# Patient Record
Sex: FEMALE | Race: WHITE | Employment: UNEMPLOYED | URBAN - METROPOLITAN AREA
[De-identification: names, ages, dates, MRNs, and addresses within clinical notes are randomized per-mention and may not be internally consistent; named-entity substitution may affect disease eponyms.]

---

## 2021-05-22 ENCOUNTER — HOSPITAL ENCOUNTER (EMERGENCY)
Age: 41
Discharge: HOME | End: 2021-05-22
Attending: EMERGENCY MEDICINE

## 2021-05-22 VITALS
SYSTOLIC BLOOD PRESSURE: 155 MMHG | BODY MASS INDEX: 32.58 KG/M2 | TEMPERATURE: 97.7 F | OXYGEN SATURATION: 99 % | HEART RATE: 70 BPM | WEIGHT: 215 LBS | DIASTOLIC BLOOD PRESSURE: 89 MMHG | RESPIRATION RATE: 18 BRPM | HEIGHT: 68 IN

## 2021-05-22 DIAGNOSIS — M54.50 ACUTE EXACERBATION OF CHRONIC LOW BACK PAIN: Primary | ICD-10-CM

## 2021-05-22 DIAGNOSIS — G89.29 ACUTE EXACERBATION OF CHRONIC LOW BACK PAIN: Primary | ICD-10-CM

## 2021-05-22 PROCEDURE — 6370000000 HC RX 637 (ALT 250 FOR IP): Performed by: STUDENT IN AN ORGANIZED HEALTH CARE EDUCATION/TRAINING PROGRAM

## 2021-05-22 PROCEDURE — 99284 EMERGENCY DEPT VISIT MOD MDM: CPT

## 2021-05-22 RX ORDER — DIAZEPAM 5 MG/1
5 TABLET ORAL EVERY 8 HOURS PRN
Qty: 10 TABLET | Refills: 0 | Status: SHIPPED | OUTPATIENT
Start: 2021-05-22 | End: 2021-05-22 | Stop reason: SDUPTHER

## 2021-05-22 RX ORDER — CYCLOBENZAPRINE HCL 10 MG
10 TABLET ORAL NIGHTLY PRN
Qty: 10 TABLET | Refills: 0 | Status: SHIPPED | OUTPATIENT
Start: 2021-05-22 | End: 2021-06-21

## 2021-05-22 RX ORDER — LIDOCAINE 4 G/G
2 PATCH TOPICAL DAILY
Status: DISCONTINUED | OUTPATIENT
Start: 2021-05-22 | End: 2021-05-22 | Stop reason: HOSPADM

## 2021-05-22 RX ORDER — DIAZEPAM 5 MG/1
5 TABLET ORAL ONCE
Status: COMPLETED | OUTPATIENT
Start: 2021-05-22 | End: 2021-05-22

## 2021-05-22 RX ORDER — DIAZEPAM 5 MG/1
5 TABLET ORAL EVERY 8 HOURS PRN
Qty: 10 TABLET | Refills: 0 | Status: SHIPPED | OUTPATIENT
Start: 2021-05-22 | End: 2021-05-26

## 2021-05-22 RX ORDER — CYCLOBENZAPRINE HCL 10 MG
10 TABLET ORAL NIGHTLY PRN
Qty: 10 TABLET | Refills: 0 | Status: SHIPPED | OUTPATIENT
Start: 2021-05-22 | End: 2021-05-22 | Stop reason: SDUPTHER

## 2021-05-22 RX ORDER — IBUPROFEN 400 MG/1
400 TABLET ORAL ONCE
Status: DISCONTINUED | OUTPATIENT
Start: 2021-05-22 | End: 2021-05-22 | Stop reason: HOSPADM

## 2021-05-22 RX ORDER — CYCLOBENZAPRINE HCL 10 MG
10 TABLET ORAL ONCE
Status: COMPLETED | OUTPATIENT
Start: 2021-05-22 | End: 2021-05-22

## 2021-05-22 RX ADMIN — CYCLOBENZAPRINE 10 MG: 10 TABLET, FILM COATED ORAL at 14:38

## 2021-05-22 RX ADMIN — DIAZEPAM 5 MG: 5 TABLET ORAL at 14:38

## 2021-05-22 ASSESSMENT — ENCOUNTER SYMPTOMS
BLOOD IN STOOL: 0
ABDOMINAL PAIN: 0
RHINORRHEA: 0
VOMITING: 0
BACK PAIN: 1
NAUSEA: 0
SORE THROAT: 0
COUGH: 0
DIARRHEA: 0
CONSTIPATION: 0

## 2021-05-22 ASSESSMENT — PAIN DESCRIPTION - PAIN TYPE: TYPE: ACUTE PAIN

## 2021-05-22 ASSESSMENT — PAIN DESCRIPTION - FREQUENCY: FREQUENCY: CONTINUOUS

## 2021-05-22 ASSESSMENT — PAIN DESCRIPTION - DIRECTION: RADIATING_TOWARDS: DOWN LEGS

## 2021-05-22 ASSESSMENT — PAIN DESCRIPTION - LOCATION: LOCATION: BACK

## 2021-05-22 ASSESSMENT — PAIN DESCRIPTION - DESCRIPTORS: DESCRIPTORS: BURNING

## 2021-05-22 NOTE — ED PROVIDER NOTES
ED Attending Attestation Note     Date of evaluation: 5/22/2021    This patient was seen by the resident. I have seen and examined the patient, agree with the workup, evaluation, management and diagnosis. The care plan has been discussed. My assessment reveals patient here with acute exacerbation of chronic low back pain who is new to the [facility] and has not yet established primary care. The patient was treated symptomatically here and there were no concerning signs on physical exam.  She will be referred to the [facility] clinic for follow-up and started on appropriate medications. Skye Dean MD  05/22/21 0506

## 2021-05-22 NOTE — ED PROVIDER NOTES
[id number] [facility] RESIDENT NOTE       Date of evaluation: 5/22/2021    Chief Complaint     Back Pain (burning pain to lower back x3 days, now with pain down both legs)      History of Present Illness     Rosario Simmonds is a 36 y.o. female with degenerative disc disease who presents with back pain. The patient reports that she deals with chronic back pain from time to time but has an episode now that started 3 days ago while she was leaning down to  a towel. This is described as a sharp pain in the right lower flank, now radiating to both legs to the level of the mid thigh. Is not associate with any weakness, numbness or tingling. She has had no issues with urinary or bowel incontinence, nor saddle anesthesia. She is ambulatory. She has had no falls or other trauma. She is not anticoagulated. She is had no fevers and does not use intravenous drugs. Previously she had been on cyclobenzaprine which seemed to help significantly with her symptoms. She just moved to the area from [facility]. Review of Systems     Review of Systems   Constitutional: Negative for chills, fever and unexpected weight change. HENT: Negative for rhinorrhea and sore throat. Eyes: Negative for visual disturbance. Respiratory: Negative for cough. Cardiovascular: Negative for chest pain and leg swelling. Gastrointestinal: Negative for abdominal pain, blood in stool, constipation, diarrhea, nausea and vomiting. Genitourinary: Negative for dysuria. Musculoskeletal: Positive for back pain. Skin: Negative for rash. Neurological: Negative for dizziness, weakness, numbness and headaches. Physical Exam     INITIAL VITALS: BP: (!) 217/154, Temp: 98.9 °F (37.2 °C), Pulse: 82, Resp: 19, SpO2: 99 %   General: Uncomfortable but nontoxic appearing woman  HEENT:  Normocephalic,  atraumatic. PERRL. Conjunctivae pink, moist. No scleral icterus. No nasal discharge.  Moist mucus membranes. Neck:  Supple. Trachea midline. Pulmonary:   Normal respiratory effort. Cardiac: Regular rate and rhythm. No murmurs, gallops or rubs. Radial pulses 2+ bilaterally. Capillary refill < 2 seconds in fingers. Abdomen:  Soft, not tender, not distended. Bowel sounds present. Musculoskeletal:  Normal bulk and tone for age. Skin:  Warm, dry. No rashes, ecchymosis or cyanosis. Neuro: Alert and oriented x 4. Cranial nerves grossly intact. Moving all extremities equally -hip flexors are 5 out of 5 bilaterally as are knee extensors, flexors, ankle plantar and dorsiflexion. Toes are mute bilaterally. Patellar reflexes 1+ bilaterally. No clonus in the bilateral ankles. Sensations intact to light touch throughout the dermatomes of the lower extremities bilaterally. Gait normal.  Extremities:  No peripheral edema. Psych: Euthymic affect. Normal rate and rhythm of speech with full prosody. Linear thought process. ED Course     Nursing Notes, Past Medical Hx, Past Surgical Hx, Social Hx, Allergies, and Family Hx were reviewed. The patient was given the following medications:  Orders Placed This Encounter   Medications    DISCONTD: lidocaine 4 % external patch 2 patch    DISCONTD: ibuprofen (ADVIL;MOTRIN) tablet 400 mg    cyclobenzaprine (FLEXERIL) tablet 10 mg    diazePAM (VALIUM) tablet 5 mg    DISCONTD: cyclobenzaprine (FLEXERIL) 10 MG tablet     Sig: Take 1 tablet by mouth nightly as needed for Muscle spasms     Dispense:  10 tablet     Refill:  0    DISCONTD: diazePAM (VALIUM) 5 MG tablet     Sig: Take 1 tablet by mouth every 8 hours as needed (back pain) for up to 10 doses. Dispense:  10 tablet     Refill:  0    cyclobenzaprine (FLEXERIL) 10 MG tablet     Sig: Take 1 tablet by mouth nightly as needed for Muscle spasms     Dispense:  10 tablet     Refill:  0    diazePAM (VALIUM) 5 MG tablet     Sig: Take 1 tablet by mouth every 8 hours as needed (back pain) for up to 10 doses. diazePAM (VALIUM) 5 MG tablet Take 1 tablet by mouth every 8 hours as needed (back pain) for up to 10 doses. , Disp-10 tablet, R-0Print             DISPOSITION Decision To Discharge 05/22/2021 02:05:17 PM      Diagnostic Results     EKG   Interpreted in conjunction with emergency department physician No att. providers found  None    RADIOLOGY:  No orders to display       LABS:   No results found for this visit on 05/22/21. ED BEDSIDE ULTRASOUND:  None     RECENT VITALS:  BP: (!) 155/89, Temp: 97.7 °F (36.5 °C), Pulse: 70, Resp: 18, SpO2: 99 %     Procedures     None     Past Medical, Surgical, Family, and Social History     She has a past medical history of Kidney stone. She has a past surgical history that includes Kidney removal.  Her family history is not on file. She reports that she has been smoking. She has been smoking about 1.00 pack per day. She has never used smokeless tobacco. She reports current alcohol use. She reports previous drug use. Medications     Discharge Medication List as of 5/22/2021  3:04 PM          Allergies     She has No Known Allergies.        Meet Stevenson MD  Resident  05/22/21 4429

## 2021-05-22 NOTE — ED NOTES
Patient discharged with AVS and personal belongings. Reviewed and educated on medications. Verbalizes understanding of instructions and need for follow up with. Denies any questions at this time. No signs of acute distress.       Jovany Cruz RN  05/22/21 1446

## 2021-05-24 NOTE — ED NOTES
Patient called the ED this AM.  States that one Rx for cyclobenzaprine was e-prescribed to her pharmacy, [facility]. However, the Rx for diazepam was not at [facility]. Called [facility] [phone], confirmed receipt of cyclobenzaprine on 5/22. Verbally provided Rx for diazepam as generated by Dr. Leila Carlos on 5/22: Diazepam 5mg PO q8h prn back pain Disp #10 with no refills. Patient aware that diazepam now called in. States that she did contact [phone] for an appointment already for f/u.       Dawn CamarilloD., BCPS   5/24/2021 9:44 AM  Wireless: [phone]

## 2021-06-01 ENCOUNTER — OFFICE VISIT (OUTPATIENT)
Dept: INTERNAL MEDICINE CLINIC | Age: 41
End: 2021-06-01

## 2021-06-01 VITALS
BODY MASS INDEX: 38.91 KG/M2 | RESPIRATION RATE: 16 BRPM | SYSTOLIC BLOOD PRESSURE: 121 MMHG | DIASTOLIC BLOOD PRESSURE: 76 MMHG | HEART RATE: 102 BPM | OXYGEN SATURATION: 97 % | TEMPERATURE: 96.8 F | HEIGHT: 66 IN | WEIGHT: 242.1 LBS

## 2021-06-01 DIAGNOSIS — M54.50 CHRONIC MIDLINE LOW BACK PAIN, UNSPECIFIED WHETHER SCIATICA PRESENT: ICD-10-CM

## 2021-06-01 DIAGNOSIS — Z90.5 HISTORY OF NEPHRECTOMY, LEFT: ICD-10-CM

## 2021-06-01 DIAGNOSIS — M79.7 FIBROMYALGIA: ICD-10-CM

## 2021-06-01 DIAGNOSIS — Z76.89 ENCOUNTER TO ESTABLISH CARE: ICD-10-CM

## 2021-06-01 DIAGNOSIS — G89.29 CHRONIC MIDLINE LOW BACK PAIN, UNSPECIFIED WHETHER SCIATICA PRESENT: ICD-10-CM

## 2021-06-01 DIAGNOSIS — Z13.9 ENCOUNTER FOR HEALTH-RELATED SCREENING: Primary | ICD-10-CM

## 2021-06-01 PROCEDURE — 99213 OFFICE O/P EST LOW 20 MIN: CPT | Performed by: STUDENT IN AN ORGANIZED HEALTH CARE EDUCATION/TRAINING PROGRAM

## 2021-06-01 RX ORDER — DULOXETIN HYDROCHLORIDE 30 MG/1
30 CAPSULE, DELAYED RELEASE ORAL DAILY
Qty: 7 CAPSULE | Refills: 0 | Status: SHIPPED | OUTPATIENT
Start: 2021-06-01

## 2021-06-01 RX ORDER — METAXALONE 800 MG/1
800 TABLET ORAL 4 TIMES DAILY
Qty: 56 TABLET | Refills: 0 | Status: SHIPPED | OUTPATIENT
Start: 2021-06-01 | End: 2021-06-15

## 2021-06-01 RX ORDER — DULOXETIN HYDROCHLORIDE 60 MG/1
60 CAPSULE, DELAYED RELEASE ORAL DAILY
Qty: 30 CAPSULE | Refills: 1 | Status: SHIPPED | OUTPATIENT
Start: 2021-06-08

## 2021-06-01 RX ORDER — ACETAMINOPHEN 500 MG
1000 TABLET ORAL EVERY 4 HOURS PRN
COMMUNITY

## 2021-06-01 RX ORDER — DIAZEPAM 5 MG/1
5 TABLET ORAL EVERY 8 HOURS PRN
COMMUNITY

## 2021-06-01 ASSESSMENT — ENCOUNTER SYMPTOMS
BACK PAIN: 1
RESPIRATORY NEGATIVE: 1
GASTROINTESTINAL NEGATIVE: 1

## 2021-06-01 NOTE — PROGRESS NOTES
Outpatient Clinic Established Patient Note    Patient: Arron Houston  : 1980 (36 y.o.)  Date: 2021    CC: hospital follow-up    HPI:      Patient is a 37 yo F with PMHx significant for kidney stones s/p L nephrectomy in  and DDD s/p ? C4-7 fusion who presents to [facility] care. She recently moved to the area from [facility] in search of work. Back pain: Has long standing history of back pain 2/2 ?degenerative joint disease. She presented to the ER on  with intractable lower back pain. She was sent home with valium and flexeril. She says her back pain has improved following her ER visit, but she is still having a hard time getting \"comfortable\" and feels like she constantly has to change positions to get pain relief. Her pain is localized over her lower back with radiation down both legs. It's a constant throbbing pain. She has no red flag symptoms such as bowel or bladder incontinence. No saddle anesthesia. No real numbness/tingling, just pain. She cannot identify any triggers that led to the onset of symptoms. She is on flexeril and valium, which help with pain, but makes her very tired. She is taking tylenol around the clock. Hx kidney stones, s/p L nephrectomy: I cannot locate any records regarding surgery. She says she had frequent kidney stones which led to kidney dysfunction which prompted surgery. No further episodes of kidney stones. Does not have nephrologist     Healthcare maintenance: Has strong family history of breast and cervical cancer. Her last mammo was ~ 6 years ago. Smokes cigarettes, 1ppd x 27 years. No interested in quitting. She does not drink alcohol. No illicit drug use. Home Meds:  Prior to Visit Medications    Medication Sig Taking? Authorizing Provider   diazePAM (VALIUM) 5 MG tablet Take 5 mg by mouth every 8 hours as needed for Anxiety.  Yes Historical Provider, MD   acetaminophen (TYLENOL) 500 MG tablet Take 1,000 mg by mouth every 4 hours as needed for Pain Yes Historical Provider, MD   cyclobenzaprine (FLEXERIL) 10 MG tablet Take 1 tablet by mouth nightly as needed for Muscle spasms Yes Nuno Mohan MD       Allergies:    Patient has no known allergies. Health Maintenance Due   Topic Date Due    Hepatitis C screen  Never done    Varicella vaccine (1 of 2 - 2-dose childhood series) Never done    Pneumococcal 0-64 years Vaccine (1 of 2 - PPSV23) Never done    COVID-19 Vaccine (1) Never done    HIV screen  Never done    DTaP/Tdap/Td vaccine (1 - Tdap) Never done    Cervical cancer screen  Never done    Lipid screen  Never done    Diabetes screen  Never done         There is no immunization history on file for this patient. Review of Systems   Constitutional: Negative. HENT: Negative. Respiratory: Negative. Cardiovascular: Negative. Gastrointestinal: Negative. Genitourinary: Negative. Musculoskeletal: Positive for arthralgias and back pain. Neurological: Negative. A 10-organ Review Of Systems was obtained and otherwise unremarkable except as per HPI. Data: Old records have been reviewed electronically. PHYSICAL EXAM:  /76 (Site: Left Upper Arm, Position: Sitting, Cuff Size: Large Adult)   Pulse 102   Temp 96.8 °F (36 °C) (Temporal)   Resp 16   Ht 5' 6\" (1.676 m)   Wt 242 lb 1.6 oz (109.8 kg)   SpO2 97%   BMI 39.08 kg/m²   Physical Exam  Constitutional:       Appearance: Normal appearance. HENT:      Head: Normocephalic. Nose: Nose normal.      Mouth/Throat:      Mouth: Mucous membranes are moist.   Eyes:      Extraocular Movements: Extraocular movements intact. Pupils: Pupils are equal, round, and reactive to light. Cardiovascular:      Rate and Rhythm: Regular rhythm. Tachycardia present. Heart sounds: No murmur heard. No friction rub. No gallop. Pulmonary:      Effort: Pulmonary effort is normal.      Breath sounds: Normal breath sounds. No stridor. No wheezing or rales. Abdominal:      General: Abdomen is flat. Bowel sounds are normal.      Palpations: Abdomen is soft. Musculoskeletal:      Comments: Paraspinal and spinal tenderness noted over L spine (L3-5). Positive straight leg test, (L > R). Point tenderness noted over shoulders/trapezius. Neurological:      General: No focal deficit present. Mental Status: She is alert and oriented to person, place, and time. Psychiatric:         Mood and Affect: Mood normal.         Assessment & Plan:      1. Encounter for health-related screening  2. Encounter to establish care  - Lipid, Fasting; Future  - CBC WITH AUTO DIFFERENTIAL; Future  - COMPREHENSIVE METABOLIC PANEL; Future  - TSH with Reflex; Future  - HEMOGLOBIN A1C; Future  - KAYLA DIGITAL SCREEN W OR WO CAD BILATERAL; Future      3. Chronic midline low back pain, unspecified whether sciatica present  Patient with acute on chronic lower back pain with radiation of pain down both legs. No significant radiculopathy. No red flag symptoms. She does not have insurance, so no recent imaging available. She has gotten relief with flexeril and valium, but feels \"knocked out\" when she takes it. Unfortunately, due to her history of nephrectomy, I am unable to start NSAIDs.   - Encouraged patient to only use valium if she is experiencing severe muscle spasms   - Ok to use Flexeril at night to help with sleep  - Start Skelaxin 800mg QID  - Start Cymbalta, will start at 30mg and titrate up to 60mg in one week   - Lidocaine patches, voltaren gel PRN   - Instructed patient to cut way back on Tylenol, she reports taking 1000mg Q 4 hours. I told her she should not exceed 3000mg in a 24 hours period, she voiced understanding     4. Hx of FM- Has some point tenderness on exam.   - Start Cymbalta as above     5. History of L nephrectomy   - Follow-up lab work   - Avoid NSAIds      Return in about 4 weeks (around 6/29/2021).     Dispo: Pt has been staffed with Dr. Usman Gonsalves _______________  Lamar Parr MD, 6/1/2021 9:14 AM   PGY-3

## 2022-07-08 ENCOUNTER — HOSPITAL ENCOUNTER (EMERGENCY)
Facility: HOSPITAL | Age: 42
Discharge: HOME | End: 2022-07-08
Attending: EMERGENCY MEDICINE | Admitting: EMERGENCY MEDICINE

## 2022-07-08 VITALS
HEART RATE: 96 BPM | SYSTOLIC BLOOD PRESSURE: 120 MMHG | OXYGEN SATURATION: 97 % | RESPIRATION RATE: 18 BRPM | TEMPERATURE: 97.7 F | DIASTOLIC BLOOD PRESSURE: 63 MMHG | HEIGHT: 68 IN

## 2022-07-08 DIAGNOSIS — G43.809 OTHER MIGRAINE WITHOUT STATUS MIGRAINOSUS, NOT INTRACTABLE: Primary | ICD-10-CM

## 2022-07-08 PROCEDURE — 99282 EMERGENCY DEPT VISIT SF MDM: CPT

## 2022-07-08 PROCEDURE — 25010000002 DIPHENHYDRAMINE PER 50 MG: Performed by: PHYSICIAN ASSISTANT

## 2022-07-08 PROCEDURE — 96374 THER/PROPH/DIAG INJ IV PUSH: CPT

## 2022-07-08 PROCEDURE — 96375 TX/PRO/DX INJ NEW DRUG ADDON: CPT

## 2022-07-08 PROCEDURE — 25010000002 PROCHLORPERAZINE 10 MG/2ML SOLUTION: Performed by: PHYSICIAN ASSISTANT

## 2022-07-08 RX ORDER — DIPHENHYDRAMINE HYDROCHLORIDE 50 MG/ML
25 INJECTION INTRAMUSCULAR; INTRAVENOUS ONCE
Status: COMPLETED | OUTPATIENT
Start: 2022-07-08 | End: 2022-07-08

## 2022-07-08 RX ORDER — PROCHLORPERAZINE EDISYLATE 5 MG/ML
10 INJECTION INTRAMUSCULAR; INTRAVENOUS ONCE
Status: COMPLETED | OUTPATIENT
Start: 2022-07-08 | End: 2022-07-08

## 2022-07-08 RX ORDER — SODIUM CHLORIDE 0.9 % (FLUSH) 0.9 %
10 SYRINGE (ML) INJECTION AS NEEDED
Status: DISCONTINUED | OUTPATIENT
Start: 2022-07-08 | End: 2022-07-08 | Stop reason: HOSPADM

## 2022-07-08 RX ADMIN — SODIUM CHLORIDE 1000 ML: 9 INJECTION, SOLUTION INTRAVENOUS at 11:30

## 2022-07-08 RX ADMIN — PROCHLORPERAZINE EDISYLATE 10 MG: 5 INJECTION INTRAMUSCULAR; INTRAVENOUS at 11:30

## 2022-07-08 RX ADMIN — DIPHENHYDRAMINE HYDROCHLORIDE 25 MG: 50 INJECTION, SOLUTION INTRAMUSCULAR; INTRAVENOUS at 11:30

## 2022-07-08 NOTE — ED NOTES
Pt arrived via PV with c/o migraine that started five days ago. Pt reports she is have N&V and photophobia.

## 2022-07-08 NOTE — ED PROVIDER NOTES
EMERGENCY DEPARTMENT ENCOUNTER    Room Number:  A02/02  Date of encounter:  7/8/2022  PCP: Provider, No Known  Historian: Patient      I used full protective equipment while examining this patient.  This includes face mask, gloves and protective eyewear.  I washed my hands before entering the room and immediately upon leaving the room      HPI:  Chief Complaint: Headache  A complete HPI/ROS/PMH/PSH/SH/FH are unobtainable due to: Nothing    Context: Lorri Ochoa is a 41 y.o. female who presents to the ED c/o gradual onset of a headache starting 5 days ago.  Patient states she has not had a migraine in many years however this headache feels fairly similar.  She describes a viselike sensation over the left side of her skull.  She states the pain initially was mild however has become moderate and is constant.  She describes light and sound sensitivity. She has had associated nausea and vomiting.  She denies any neck pain, fever, any neurodeficits.  She has taken Tylenol at home without any improvement.    Review of Medical Records  I reviewed patient's last ER visit from 3/6/2022.  Patient seen for pyelonephritis.    PAST MEDICAL HISTORY  Active Ambulatory Problems     Diagnosis Date Noted   • No Active Ambulatory Problems     Resolved Ambulatory Problems     Diagnosis Date Noted   • No Resolved Ambulatory Problems     No Additional Past Medical History         PAST SURGICAL HISTORY  No past surgical history on file.      FAMILY HISTORY  No family history on file.      SOCIAL HISTORY  Social History     Socioeconomic History   • Marital status:          ALLERGIES  Ibuprofen        REVIEW OF SYSTEMS  All systems reviewed and negative except for those discussed in HPI.       PHYSICAL EXAM    I have reviewed the triage vital signs and nursing notes.    ED Triage Vitals [07/08/22 0830]   Temp Heart Rate Resp BP SpO2   97.7 °F (36.5 °C) 96 18 -- 97 %      Temp src Heart Rate Source Patient Position BP Location  "FiO2 (%)   Tympanic -- -- -- --       Physical Exam  GENERAL: Alert, oriented, nontoxic-appearing, not distressed  HENT: head atraumatic, no nuchal rigidity  EYES: no scleral icterus, EOMI  CV: regular rhythm, regular rate, no murmur  RESPIRATORY: normal effort, CTA  ABDOMEN: soft, nontender  MUSCULOSKELETAL: no deformity, FROM, no calf swelling or tenderness  NEURO: alert, normal cerebellar function, cranial nerves II through XII grossly intact  SKIN: warm, dry    MEDICATIONS GIVEN IN ER    Medications   sodium chloride 0.9 % flush 10 mL (has no administration in time range)   prochlorperazine (COMPAZINE) injection 10 mg (10 mg Intravenous Given 7/8/22 1130)   diphenhydrAMINE (BENADRYL) injection 25 mg (25 mg Intravenous Given 7/8/22 1130)   sodium chloride 0.9 % bolus 1,000 mL (1,000 mL Intravenous New Bag 7/8/22 1130)         PROGRESS, DATA ANALYSIS, CONSULTS, AND MEDICAL DECISION MAKING    All labs have been independently reviewed by me.  All radiology studies have been reviewed by me and discussed with radiologist dictating the report.   EKG's independently viewed and interpreted by me.  Discussion below represents my analysis of pertinent findings related to patient's condition, differential diagnosis, treatment plan and final disposition.    I have discussed case with Dr. Gonzalez, emergency room physician.  He has performed his own bedside examination and agrees with treatment plan.    ED Course as of 07/08/22 1247   Fri Jul 08, 2022   1054 Patient presents with 5-day history of \"migraine\".  Patient has had photophobia, phonophobia, nausea, vomiting.  No fever, neck pain, neurodeficits.  Plan for migraine cocktail and reevaluation.  Differential diagnoses include but not limited to migraine headache, meningitis, venous sinus thrombosis. [EE]   1243 Recheck of patient.  She states her headache is much improved.  Reexamination shows no neurodeficits.  No nuchal rigidity.  We will discharge. [EE]      ED Course " User Index  [EE] Gera Ceballos PA       AS OF 12:47 EDT VITALS:    BP - 120/63  HR - 96  TEMP - 97.7 °F (36.5 °C) (Tympanic)  O2 SATS - 97%        DIAGNOSIS  Final diagnoses:   Other migraine without status migrainosus, not intractable         DISPOSITION  Discharged      Dictated utilizing Dragon dictation     Gera Ceballos PA  07/08/22 7429

## 2022-07-08 NOTE — ED PROVIDER NOTES
"MD ATTESTATION NOTE    The HALLEY and I have discussed this patient's history, physical exam, and treatment plan.  I have reviewed the documentation and personally had a face to face interaction with the patient. I affirm the documentation and agree with the treatment and plan.  The attached note describes my personal findings.      I provided a substantive portion of the care of the patient.  I personally performed the physical exam in its entirety, and below are my findings.  For this patient encounter, the patient wore surgical mask, I wore full protective PPE including N95 and eye protection    Brief HPI: 41-year-old female who presents with 5 days of a \"migraine\".  Patient states she has not had a migraine in 5 to 6 years but that she has been fighting 1 for the last 5 to 6 days.  She describes as a throbbing sensation over the left side of her head that has been moderate and constant.  She states she does have light and sound sensitivity along with nausea and vomiting.  She denies fevers, chills, neck pain or neurodeficits.    General : 41-year-old patient is awake alert and oriented  HEENT: NCAT  CV: Heart is regular with no murmurs  Respiratory: CTA bilaterally  Abd: Soft and nontender  Ext: No acute abnormalities  Skin: No rash  Neuro: Cranial nerves II through XII grossly intact as tested.  No acute lateralizing deficits.  Psych: Normal mood and affect      Plan: Treat patient symptomatically for migraine and then reevaluate  After Compazine and Benadryl the patient states she feels much better.  She is afebrile with a normal neuro examination and I believe stable for discharge home and outpatient follow-up.       Jim Gonzalez MD  07/08/22 5360    "

## 2022-10-13 ENCOUNTER — OFFICE VISIT (OUTPATIENT)
Dept: ORTHOPEDIC SURGERY | Facility: CLINIC | Age: 42
End: 2022-10-13

## 2022-10-13 VITALS — WEIGHT: 263 LBS | HEIGHT: 67 IN | TEMPERATURE: 98.6 F | BODY MASS INDEX: 41.28 KG/M2

## 2022-10-13 DIAGNOSIS — S93.402S SPRAIN OF LEFT ANKLE, UNSPECIFIED LIGAMENT, SEQUELA: Primary | ICD-10-CM

## 2022-10-13 DIAGNOSIS — S86.302A INJURY OF PERONEAL TENDON OF LEFT FOOT, INITIAL ENCOUNTER: ICD-10-CM

## 2022-10-13 DIAGNOSIS — G57.92 NEURITIS OF LEFT FOOT: ICD-10-CM

## 2022-10-13 DIAGNOSIS — M19.079 ARTHRITIS OF FOOT: ICD-10-CM

## 2022-10-13 PROBLEM — S93.402A SPRAIN OF LEFT ANKLE: Status: ACTIVE | Noted: 2022-10-13

## 2022-10-13 PROCEDURE — 99244 OFF/OP CNSLTJ NEW/EST MOD 40: CPT | Performed by: ORTHOPAEDIC SURGERY

## 2022-10-13 PROCEDURE — 73630 X-RAY EXAM OF FOOT: CPT | Performed by: ORTHOPAEDIC SURGERY

## 2022-10-13 RX ORDER — GABAPENTIN 300 MG/1
300 CAPSULE ORAL
Qty: 30 CAPSULE | Refills: 1 | Status: SHIPPED | OUTPATIENT
Start: 2022-10-13 | End: 2022-11-10

## 2022-10-13 NOTE — PROGRESS NOTES
New Patient Complaint      Patient: Lorri Ochoa  YOB: 1980 41 y.o. female  Medical Record Number: [id number]    Chief Complaints: My foot and ankle hurt    History of Present Illness: Patient reports an approximate 1-1/2-year history of pain in her left foot and ankle.  She has had some previous sprains and complains of pain over the dorsum of the midfoot hindfoot area and some radiating down into the toes as well as pain in the ankle.  Anterolaterally and some posterior laterally more so than medially.  She reports feelings of pins-and-needles feeling in her toes and foot that has been progressively worsening.    She has been told in the past that she may have some sciatic nerve symptoms.  She has a history of fibromyalgia as well as previous back surgery.    Symptoms are worse after prolonged standing all day at work.          Patient is seen today at the request of CAIN Prieto who requested an opinion regarding etiology and treatment of this condition       HPI    Allergies:   Allergies   Allergen Reactions   • Bee Venom Anaphylaxis   • Aspirin Hives   • Ibuprofen Other (See Comments)     Pt states she's not suppose to due to having one kidney        Medications:   Current Outpatient Medications on File Prior to Visit   Medication Sig   • acetaminophen (TYLENOL) 500 MG tablet Take 1,000 mg by mouth.   • Diclofenac Sodium (VOLTAREN) 1 % gel gel Apply 4 g topically to the appropriate area as directed 4 (Four) Times a Day As Needed (foot and back pain).   • cyclobenzaprine (FLEXERIL) 10 MG tablet Take 0.5-1 tablets by mouth 3 (Three) Times a Day As Needed for Muscle Spasms. Causes drowsiness. May need to take just at night     No current facility-administered medications on file prior to visit.       Past Medical History:   Diagnosis Date   • Fibromyalgia    • H/O degenerative disc disease      Past Surgical History:   Procedure Laterality Date   • BACK SURGERY     • NEPHRECTOMY Left   "    Social History     Occupational History   • Not on file   Tobacco Use   • Smoking status: Every Day   • Smokeless tobacco: Never   Vaping Use   • Vaping Use: Some days   Substance and Sexual Activity   • Alcohol use: Not on file   • Drug use: Defer   • Sexual activity: Defer      Social History     Social History Narrative   • Not on file     Family History   Problem Relation Age of Onset   • Heart disease Mother    • Cancer Mother    • Heart disease Father    • Cancer Father    • Diabetes Sister    • Diabetes Maternal Grandmother    • Diabetes Maternal Grandfather        Review of Systems: 14 point review of systems performed, positive pertinent findings identified in HPI. All remaining systems negative     Review of Systems      Physical Exam:   Vitals:    10/13/22 0901   Temp: 98.6 °F (37 °C)   Weight: 119 kg (263 lb)   Height: 170.2 cm (67.01\")     Physical Exam   Constitutional: pleasant, well developed She is with her daughter  Eyes: sclera non icteric  Hearing : adequate for exam  Cardiovascular: palpable pulses in left foot, left calf/ thigh NT without sign of DVT  Respiratoy: breathing unlabored   Neurological: grossly sensate to LT throughout left LE with some diminished sensation in the left foot with somewhat of a Tinel's over the deep peroneal nerve.  Psychiatric: oriented with normal mood and affect.   Lymphatic: No palpable popliteal lymphadenopathy left LE  Skin: intact throughout left leg/foot  Musculoskeletal: Left foot shows moderate discomfort of the dorsum of the midfoot more so than to the forefoot.  She was tender over the anterolateral ankle with questionable anterior drawer with some guarding and pain along the peroneal tendons.  Extremely gross motor weakness on plantarflexion and dorsiflexion of the foot or extension of the great toe.  Physical Exam  Ortho Exam    Radiology: 3 views of the left foot ordered evaluate pain reviewed and no prior x-ray images available for comparison.  Did " review reports of x-ray of left foot and ankle from 6/8/2022 from [facility] which described no fractures or dislocations no foreign body erosions or destruction.    Today's x-rays show some mild hallux valgus.  There is a little bit of arthritis around the first TMT joint and medial naviculocuneiform joint but nothing dramatic.    Assessment/Plan: 1.  Left foot and ankle pain with previous sprain persistent complaints of pain at the ankle or midfoot more so than forefoot with worsening neuritic symptoms.    Previously regarding this is obviously somewhat of a complex case and I do not really have a definitive answer for her at this time.  Could be something coming from her back.    We will start her on some gabapentin 300 mg nightly and get an MRI of her left ankle including her midfoot defect for any structural abnormalities that could possibly be addressed surgically.    We will also send her for formal neurologic evaluation and refer her for EMG nerve conduction study as well.    We will see her back in 3 to 4 weeks to review MRI and see how she doing with the gabapentin and determine treatment going forward.

## 2022-10-19 ENCOUNTER — OFFICE VISIT (OUTPATIENT)
Dept: FAMILY MEDICINE CLINIC | Facility: CLINIC | Age: 42
End: 2022-10-19

## 2022-10-19 VITALS
SYSTOLIC BLOOD PRESSURE: 124 MMHG | HEIGHT: 68 IN | BODY MASS INDEX: 40.47 KG/M2 | WEIGHT: 267 LBS | OXYGEN SATURATION: 99 % | TEMPERATURE: 98.6 F | DIASTOLIC BLOOD PRESSURE: 82 MMHG | HEART RATE: 88 BPM

## 2022-10-19 DIAGNOSIS — M51.37 DDD (DEGENERATIVE DISC DISEASE), LUMBOSACRAL: ICD-10-CM

## 2022-10-19 DIAGNOSIS — Z00.00 ENCOUNTER FOR MEDICAL EXAMINATION TO ESTABLISH CARE: Primary | ICD-10-CM

## 2022-10-19 DIAGNOSIS — Z23 IMMUNIZATION DUE: ICD-10-CM

## 2022-10-19 DIAGNOSIS — M79.672 LEFT FOOT PAIN: ICD-10-CM

## 2022-10-19 PROBLEM — M79.7 FIBROMYALGIA: Status: ACTIVE | Noted: 2022-10-19

## 2022-10-19 PROCEDURE — 90471 IMMUNIZATION ADMIN: CPT | Performed by: NURSE PRACTITIONER

## 2022-10-19 PROCEDURE — 90686 IIV4 VACC NO PRSV 0.5 ML IM: CPT | Performed by: NURSE PRACTITIONER

## 2022-10-19 PROCEDURE — 99214 OFFICE O/P EST MOD 30 MIN: CPT | Performed by: NURSE PRACTITIONER

## 2022-10-19 RX ORDER — BACLOFEN 10 MG/1
10 TABLET ORAL 3 TIMES DAILY
Qty: 90 TABLET | Refills: 0 | Status: SHIPPED | OUTPATIENT
Start: 2022-10-19 | End: 2022-11-23 | Stop reason: SDUPTHER

## 2022-10-19 NOTE — PROGRESS NOTES
Chief Complaint  Annual Exam and Foot Pain (Having MRI 11/10/2022)    Subjective          Lorri Ochoa presents to [facility]  History of Present Illness  This is my first time seeing this patient.    Establish care-Reviewed allergies, medical history, surgical history, and family history. Patient denies any alcohol use, or illicit drug use; admits to regular cigarette smoking and vaping. Lives at home with 2 daughters, 1 dog. Patient has access to regular dental care but doesn't go regularly, brushes and flosses teeth regularly. Last pap smear in 2015, same with mammogram. Received 2 Covid-19 vaccines and 1 booster, will get records from [facility]. Would like to get flu vaccine today.     Bilateral foot pain, seeing Ortho on 11/10, MRI scheduled for 11/3. Taking gabapentin for pain, wearing compression socks.     Back pain/spasms- hx of DDD, previously on flexeril, would like to start a different muscle relaxer.       Review of Systems   Constitutional: Positive for fatigue. Negative for fever.   HENT: Negative for congestion, ear pain and rhinorrhea.    Eyes: Negative for blurred vision and pain.   Respiratory: Negative for cough, shortness of breath and wheezing.    Cardiovascular: Negative for chest pain, palpitations and leg swelling.   Gastrointestinal: Negative for abdominal pain, constipation, diarrhea, nausea and vomiting.   Genitourinary: Negative for difficulty urinating, frequency and urgency.   Musculoskeletal: Positive for arthralgias, back pain and myalgias. Negative for neck pain.   Skin: Negative for color change and rash.   Neurological: Negative for dizziness, weakness and headache.   Psychiatric/Behavioral: Positive for decreased concentration, sleep disturbance, depressed mood and stress. Negative for suicidal ideas. The patient is nervous/anxious.       Objective   Vital Signs:   /82 (BP Location: Right arm, Patient Position: Sitting, Cuff Size: Large Adult)  "  Pulse 88   Temp 98.6 °F (37 °C) (Temporal)   Ht 172.7 cm (68\")   Wt 121 kg (267 lb)   SpO2 99%   BMI 40.60 kg/m²     Class 3 Severe Obesity (BMI >=40). Obesity-related health conditions include the following: none. Obesity is newly identified. BMI is is above average; BMI management plan is completed. We discussed portion control and increasing exercise.      Physical Exam  Vitals reviewed.   Constitutional:       General: She is awake. She is not in acute distress.     Appearance: Normal appearance.   Neck:      Thyroid: No thyroid mass or thyroid tenderness.      Vascular: No carotid bruit or JVD.   Cardiovascular:      Rate and Rhythm: Normal rate and regular rhythm.      Pulses: Normal pulses.           Radial pulses are 2+ on the right side and 2+ on the left side.        Dorsalis pedis pulses are 2+ on the right side and 2+ on the left side.        Posterior tibial pulses are 2+ on the right side and 2+ on the left side.      Heart sounds: Normal heart sounds.   Pulmonary:      Effort: Pulmonary effort is normal.      Breath sounds: Normal breath sounds.   Musculoskeletal:      Lumbar back: Spasms and tenderness present. Decreased range of motion.      Right lower le+ Edema present.      Left lower le+ Edema present.      Right ankle: Swelling present. Tenderness present. Decreased range of motion.      Left ankle: Swelling present. Tenderness present. Decreased range of motion.      Right foot: Decreased range of motion. Tenderness and bony tenderness present.      Left foot: Decreased range of motion. Tenderness and bony tenderness present.   Lymphadenopathy:      Cervical: No cervical adenopathy.   Skin:     General: Skin is warm and dry.      Capillary Refill: Capillary refill takes less than 2 seconds.   Neurological:      General: No focal deficit present.      Mental Status: She is alert.   Psychiatric:         Attention and Perception: Attention normal.         Mood and Affect: Mood " normal.         Speech: Speech normal.         Behavior: Behavior is cooperative.        Result Review :     Assessment and Plan    Diagnoses and all orders for this visit:    1. Encounter for medical examination to establish care (Primary)    2. DDD (degenerative disc disease), lumbosacral  -     baclofen (LIORESAL) 10 MG tablet; Take 1 tablet by mouth 3 (Three) Times a Day for 30 days.  Dispense: 90 tablet; Refill: 0    3. Left foot pain  -     Diclofenac Sodium (VOLTAREN) 1 % gel gel; Apply 4 g topically to the appropriate area as directed 4 (Four) Times a Day As Needed (foot and back pain).  Dispense: 100 g; Refill: 0    4. Immunization due  -     FluLaval/Fluzone >6 mos ([id number])    Prescribed baclofen to help with back spasms and degenerative disc disease.  Refilled Voltaren gel for bilateral foot pain.  Discussed with patient that when she gets MRI and has Ortho appointment, should make follow-up appointment to discuss how the Voltaren gel and baclofen has been doing/if it has been effective.  If symptoms have worsened or fail to improve, discussed with patient about getting lab work at next visit for further evaluation.  Flu vaccine given at this visit.    I spent 35 minutes caring for Lorri on this date of service. This time includes time spent by me in the following activities:obtaining and/or reviewing a separately obtained history, performing a medically appropriate examination and/or evaluation , counseling and educating the patient/family/caregiver, ordering medications, tests, or procedures, documenting information in the medical record and care coordination     Follow Up   Return in about 4 weeks (around 11/16/2022) for Recheck.  Patient was given instructions and counseling regarding her condition or for health maintenance advice. Please see specific information pulled into the AVS if appropriate.

## 2022-11-03 ENCOUNTER — HOSPITAL ENCOUNTER (OUTPATIENT)
Dept: MRI IMAGING | Facility: HOSPITAL | Age: 42
Discharge: HOME | End: 2022-11-03
Admitting: ORTHOPAEDIC SURGERY

## 2022-11-03 DIAGNOSIS — S86.302A INJURY OF PERONEAL TENDON OF LEFT FOOT, INITIAL ENCOUNTER: ICD-10-CM

## 2022-11-03 DIAGNOSIS — S93.402S SPRAIN OF LEFT ANKLE, UNSPECIFIED LIGAMENT, SEQUELA: ICD-10-CM

## 2022-11-03 PROCEDURE — 73721 MRI JNT OF LWR EXTRE W/O DYE: CPT

## 2022-11-10 ENCOUNTER — OFFICE VISIT (OUTPATIENT)
Dept: ORTHOPEDIC SURGERY | Facility: CLINIC | Age: 42
End: 2022-11-10

## 2022-11-10 VITALS — TEMPERATURE: 97.6 F | HEIGHT: 68 IN | WEIGHT: 272.8 LBS | BODY MASS INDEX: 41.34 KG/M2

## 2022-11-10 DIAGNOSIS — M25.372 ANKLE INSTABILITY, LEFT: ICD-10-CM

## 2022-11-10 DIAGNOSIS — S93.402S SPRAIN OF LEFT ANKLE, UNSPECIFIED LIGAMENT, SEQUELA: ICD-10-CM

## 2022-11-10 DIAGNOSIS — M54.16 LUMBAR RADICULOPATHY: Primary | ICD-10-CM

## 2022-11-10 DIAGNOSIS — G57.92 NEURITIS OF LEFT FOOT: ICD-10-CM

## 2022-11-10 PROCEDURE — 72100 X-RAY EXAM L-S SPINE 2/3 VWS: CPT | Performed by: ORTHOPAEDIC SURGERY

## 2022-11-10 PROCEDURE — 99214 OFFICE O/P EST MOD 30 MIN: CPT | Performed by: ORTHOPAEDIC SURGERY

## 2022-11-10 RX ORDER — GABAPENTIN 300 MG/1
600 CAPSULE ORAL
Qty: 60 CAPSULE | Refills: 1 | Status: SHIPPED | OUTPATIENT
Start: 2022-11-10

## 2022-11-10 RX ORDER — GABAPENTIN 300 MG/1
600 CAPSULE ORAL
Qty: 60 CAPSULE | Refills: 1 | Status: SHIPPED | OUTPATIENT
Start: 2022-11-10 | End: 2022-11-10 | Stop reason: SDUPTHER

## 2022-11-10 NOTE — PROGRESS NOTES
Ankle Follow Up      Patient: Lorri Ochoa    YOB: 1980 41 y.o. female    Chief Complaints: Ankle pain    History of Present Illness:Patient was seen initially on 10/13/2022 reporting an approximate 1-1/2-year history of pain in her left foot and ankle.  She has had some previous sprains and reported pain over the dorsum of the midfoot hindfoot area and some radiating down into the toes as well as pain in the ankle.  Anterolaterally and some posterior laterally more so than medially.  She reported feelings of pins-and-needles feeling in her toes and foot that had been progressively worsening.     She had been told in the past that she may have some sciatic nerve symptoms and degenerative disc problems.  She has a history of fibromyalgia as well as previous neck surgery in [place].     Symptoms were worse after prolonged standing all day at work.    Reviewed with her that this was somewhat of a complex situation and she had had previous ankle injury but with the neuritic symptoms could be coming from her back.  She was started on gabapentin and sent for MRI of her left ankle.  She is also referred for formal neurologic evaluation for EMG nerve conduction study.     She is seen back today and has had persistent feelings of instability to the left ankle after multiple previous sprains but also still has burning pain in her left ankle somewhat anterolaterally with numbness into her first second and third toe.  Does report some intermittent shooting pain from her back down into her leg and has been told in the past that she had sciatic problems and degenerative disc problems.  Gabapentin at 300 mg nightly has helped some.    She has had previous cervical spine surgery done in [place].  She is not due to see neurology for evaluation and EMG nerve conduction study until February.     ROS: ankle pain  Past Medical History:   Diagnosis Date   • Fibromyalgia    • H/O degenerative disc disease    •  "Nephrolithiasis        Physical Exam:   Vitals:    11/10/22 0906   Temp: 97.6 °F (36.4 °C)   Weight: 124 kg (272 lb 12.8 oz)   Height: 172.7 cm (68\")   PainSc:   8     Well developed with normal mood.  On exam she has mild anterior drawer on the left compared to the right.  There is somewhat of a Tinel's over the superficial peroneal nerve and diminished sensation in the first second and third toes but unable to elicit Tinel's over the deep peroneal nerve.  She did have some pain on the back of her leg with straight leg testing.  There was no gross motor weakness.      Radiology: 3 views of the lumbar spine ordered evaluate alignment reviewed and no prior x-rays available for comparison there does appear to be some narrowing at the L5-S1 with some facet arthritis and possibly some very mild spondylolisthesis.      MRI films and report of the left ankle dated 11/3/2022 show articular cartilage is preserved without synovitis osteochondral defect or intra-articular body.  Peroneal tendons appear normal.    The distal syndesmosis and deltoid appear intact.    Posterior talofibular ligament is intact although a little thinner than usual.  Nonormal ATFL ligament tissue is identified.  Lisfranc is normal as is tarsal tunnel.      Assessment/Plan: Previous left ankle sprain with chronic ATFL injury without peroneal tendon injury  2.  Persistent left neuritic symptoms in left foot with possible lumbar origin    We discussed treatment going forward and reviewed her she may eventually require surgical treatment for stabilization of the ankle but certainly need to work the neuritic symptoms up more prior to doing any that with which she was agreed in agreement.    We had her fitted with an ASO brace to help with stability to her ankle but if it bothers the nerve symptoms in her ankle she will just use a lateral heel wedge that was provided today.    I feel we need to go ahead and get an MRI of her lumbar spine to evaluate for " stenosis, herniated disc facet arthropathy and spondylolisthesis.    Will also increase her gabapentin to 600 mg nightly and prescribed compounding cream consisting of ketoprofen 10%, lidocaine 5% and gabapentin 5% apply to the area of neuritic symptoms at her ankle 3-4 times a day.    We will see her back in 3 to 4 weeks to review MRI of her lumbar spine and possible referral to neurosurgery if needed.

## 2022-11-23 ENCOUNTER — OFFICE VISIT (OUTPATIENT)
Dept: FAMILY MEDICINE CLINIC | Facility: CLINIC | Age: 42
End: 2022-11-23

## 2022-11-23 VITALS
DIASTOLIC BLOOD PRESSURE: 86 MMHG | TEMPERATURE: 97.2 F | HEIGHT: 68 IN | BODY MASS INDEX: 40.32 KG/M2 | WEIGHT: 266 LBS | OXYGEN SATURATION: 98 % | HEART RATE: 80 BPM | SYSTOLIC BLOOD PRESSURE: 150 MMHG

## 2022-11-23 DIAGNOSIS — Z90.5 SINGLE KIDNEY: ICD-10-CM

## 2022-11-23 DIAGNOSIS — R42 DIZZINESS: ICD-10-CM

## 2022-11-23 DIAGNOSIS — Z90.5 H/O LEFT NEPHRECTOMY: ICD-10-CM

## 2022-11-23 DIAGNOSIS — M51.37 DDD (DEGENERATIVE DISC DISEASE), LUMBOSACRAL: Primary | ICD-10-CM

## 2022-11-23 PROCEDURE — 99214 OFFICE O/P EST MOD 30 MIN: CPT | Performed by: NURSE PRACTITIONER

## 2022-11-23 RX ORDER — BACLOFEN 10 MG/1
10 TABLET ORAL 3 TIMES DAILY
Qty: 270 TABLET | Refills: 1 | Status: SHIPPED | OUTPATIENT
Start: 2022-11-23 | End: 2023-02-21

## 2022-11-23 NOTE — PROGRESS NOTES
"Chief Complaint  Follow-up    Subjective          Lorri Ochoa presents to [facility]  History of Present Illness  Follow-up for back pain and left foot pain.    Baclofen working well for back pain. Voltaren gel has been good for left foot pain.  Seen by Ortho, had MRI for left foot, no fracture but OA has not, had twisted tendons/ligaments underneath the ankle. Ortho scheduled MRI of lumbar spine to see if foot pain could be caused by back pain.     Dizziness/falls noted over the past few months that has progressively been noted more often. Noted 1 syncopal episode, has tripped due to missteps from her back pain and foot pain.  Is scheduled to have neurological evaluation per Ortho orders but wants to know if there could be other causes.      Review of Systems   Musculoskeletal: Positive for arthralgias, back pain (improved) and myalgias.        Left foot pain   Neurological: Positive for dizziness and light-headedness.        Frequent falls      Objective   Vital Signs:   /86 (BP Location: Left arm, Patient Position: Sitting, Cuff Size: Large Adult)   Pulse 80   Temp 97.2 °F (36.2 °C) (Temporal)   Ht 172.7 cm (68\")   Wt 121 kg (266 lb)   SpO2 98%   BMI 40.45 kg/m²       Physical Exam  Vitals reviewed.   Constitutional:       General: She is awake. She is not in acute distress.     Appearance: Normal appearance.   Cardiovascular:      Rate and Rhythm: Normal rate and regular rhythm.      Pulses: Normal pulses.           Radial pulses are 2+ on the right side and 2+ on the left side.        Dorsalis pedis pulses are 2+ on the right side and 2+ on the left side.        Posterior tibial pulses are 2+ on the right side and 2+ on the left side.      Heart sounds: Normal heart sounds.   Pulmonary:      Effort: Pulmonary effort is normal.      Breath sounds: Normal breath sounds.   Musculoskeletal:      Lumbar back: Tenderness and bony tenderness present. Decreased range of " motion.   Skin:     General: Skin is warm and dry.      Capillary Refill: Capillary refill takes less than 2 seconds.   Neurological:      General: No focal deficit present.      Mental Status: She is alert.        Result Review :     Assessment and Plan    Diagnoses and all orders for this visit:    1. DDD (degenerative disc disease), lumbosacral (Primary)  -     baclofen (LIORESAL) 10 MG tablet; Take 1 tablet by mouth 3 (Three) Times a Day for 90 days.  Dispense: 270 tablet; Refill: 1    2. Dizziness  -     CBC & Differential  -     Comprehensive Metabolic Panel  -     TSH Rfx On Abnormal To Free T4  -     Vitamin D,25-Hydroxy  -     Vitamin B12  -     Folate    3. Single kidney  -     Ambulatory Referral to Nephrology    4. H/O left nephrectomy  -     Ambulatory Referral to Nephrology    Labs ordered to check for possible causes of dizziness.  Will notify patient of results.  Refilled baclofen for DDD management.  Order for referral to nephrology placed due to current single kidney related to history of left nephrectomy.    I spent 30 minutes caring for Lorri on this date of service. This time includes time spent by me in the following activities:reviewing tests, obtaining and/or reviewing a separately obtained history, performing a medically appropriate examination and/or evaluation , counseling and educating the patient/family/caregiver, ordering medications, tests, or procedures, referring and communicating with other health care professionals , documenting information in the medical record and care coordination     Follow Up   Return if symptoms worsen or fail to improve.  Patient was given instructions and counseling regarding her condition or for health maintenance advice. Please see specific information pulled into the AVS if appropriate.

## 2022-11-24 LAB
25(OH)D3+25(OH)D2 SERPL-MCNC: 14.8 NG/ML (ref 30–100)
ALBUMIN SERPL-MCNC: 4.6 G/DL (ref 3.5–5.2)
ALBUMIN/GLOB SERPL: 1.5 G/DL
ALP SERPL-CCNC: 79 U/L (ref 39–117)
ALT SERPL-CCNC: 12 U/L (ref 1–33)
AST SERPL-CCNC: 14 U/L (ref 1–32)
BASOPHILS # BLD AUTO: 0.04 10*3/MM3 (ref 0–0.2)
BASOPHILS NFR BLD AUTO: 0.5 % (ref 0–1.5)
BILIRUB SERPL-MCNC: 0.5 MG/DL (ref 0–1.2)
BUN SERPL-MCNC: 15 MG/DL (ref 6–20)
BUN/CREAT SERPL: 14.9 (ref 7–25)
CALCIUM SERPL-MCNC: 10.1 MG/DL (ref 8.6–10.5)
CHLORIDE SERPL-SCNC: 103 MMOL/L (ref 98–107)
CO2 SERPL-SCNC: 27.1 MMOL/L (ref 22–29)
CREAT SERPL-MCNC: 1.01 MG/DL (ref 0.57–1)
EGFRCR SERPLBLD CKD-EPI 2021: 71.4 ML/MIN/1.73
EOSINOPHIL # BLD AUTO: 0.36 10*3/MM3 (ref 0–0.4)
EOSINOPHIL NFR BLD AUTO: 4.2 % (ref 0.3–6.2)
ERYTHROCYTE [DISTWIDTH] IN BLOOD BY AUTOMATED COUNT: 12.4 % (ref 12.3–15.4)
FOLATE SERPL-MCNC: 8.64 NG/ML (ref 4.78–24.2)
GLOBULIN SER CALC-MCNC: 3.1 GM/DL
GLUCOSE SERPL-MCNC: 92 MG/DL (ref 65–99)
HCT VFR BLD AUTO: 42.9 % (ref 34–46.6)
HGB BLD-MCNC: 14.9 G/DL (ref 12–15.9)
IMM GRANULOCYTES # BLD AUTO: 0.05 10*3/MM3 (ref 0–0.05)
IMM GRANULOCYTES NFR BLD AUTO: 0.6 % (ref 0–0.5)
LYMPHOCYTES # BLD AUTO: 3.08 10*3/MM3 (ref 0.7–3.1)
LYMPHOCYTES NFR BLD AUTO: 36.2 % (ref 19.6–45.3)
MCH RBC QN AUTO: 31.5 PG (ref 26.6–33)
MCHC RBC AUTO-ENTMCNC: 34.7 G/DL (ref 31.5–35.7)
MCV RBC AUTO: 90.7 FL (ref 79–97)
MONOCYTES # BLD AUTO: 0.69 10*3/MM3 (ref 0.1–0.9)
MONOCYTES NFR BLD AUTO: 8.1 % (ref 5–12)
NEUTROPHILS # BLD AUTO: 4.3 10*3/MM3 (ref 1.7–7)
NEUTROPHILS NFR BLD AUTO: 50.4 % (ref 42.7–76)
NRBC BLD AUTO-RTO: 0 /100 WBC (ref 0–0.2)
PLATELET # BLD AUTO: 329 10*3/MM3 (ref 140–450)
POTASSIUM SERPL-SCNC: 4.9 MMOL/L (ref 3.5–5.2)
PROT SERPL-MCNC: 7.7 G/DL (ref 6–8.5)
RBC # BLD AUTO: 4.73 10*6/MM3 (ref 3.77–5.28)
SODIUM SERPL-SCNC: 138 MMOL/L (ref 136–145)
TSH SERPL DL<=0.005 MIU/L-ACNC: 2.19 UIU/ML (ref 0.27–4.2)
VIT B12 SERPL-MCNC: 478 PG/ML (ref 211–946)
WBC # BLD AUTO: 8.52 10*3/MM3 (ref 3.4–10.8)

## 2022-11-28 ENCOUNTER — TELEPHONE (OUTPATIENT)
Dept: FAMILY MEDICINE CLINIC | Facility: CLINIC | Age: 42
End: 2022-11-28

## 2022-11-28 DIAGNOSIS — E55.9 VITAMIN D DEFICIENCY: Primary | ICD-10-CM

## 2022-11-28 RX ORDER — ERGOCALCIFEROL 1.25 MG/1
50000 CAPSULE ORAL WEEKLY
Qty: 5 CAPSULE | Refills: 5 | Status: SHIPPED | OUTPATIENT
Start: 2022-11-28

## 2022-11-28 NOTE — TELEPHONE ENCOUNTER
OK FOR HUB TO READ       Blood count shows no signs of anemia or infection.  Metabolic panel shows kidney function, glucose, electrolytes, and liver function are within normal limits.  Thyroid is functioning normally, vitamin B12 and folate are within normal limits.  Vitamin D level is indicative of vitamin D deficiency, will send in vitamin D supplementation prescription that you will take once weekly.

## 2022-12-05 ENCOUNTER — HOSPITAL ENCOUNTER (OUTPATIENT)
Dept: MRI IMAGING | Facility: HOSPITAL | Age: 42
Discharge: HOME | End: 2022-12-05
Admitting: ORTHOPAEDIC SURGERY

## 2022-12-05 DIAGNOSIS — G57.92 NEURITIS OF LEFT FOOT: ICD-10-CM

## 2022-12-05 DIAGNOSIS — M54.16 LUMBAR RADICULOPATHY: ICD-10-CM

## 2022-12-05 PROCEDURE — 72148 MRI LUMBAR SPINE W/O DYE: CPT

## 2022-12-08 ENCOUNTER — OFFICE VISIT (OUTPATIENT)
Dept: ORTHOPEDIC SURGERY | Facility: CLINIC | Age: 42
End: 2022-12-08

## 2022-12-08 VITALS — HEIGHT: 68 IN | RESPIRATION RATE: 12 BRPM | TEMPERATURE: 97.6 F | WEIGHT: 264.2 LBS | BODY MASS INDEX: 40.04 KG/M2

## 2022-12-08 DIAGNOSIS — R20.0 NUMBNESS OF TOES: Primary | ICD-10-CM

## 2022-12-08 DIAGNOSIS — M25.372 ANKLE INSTABILITY, LEFT: ICD-10-CM

## 2022-12-08 DIAGNOSIS — G57.92 NEURITIS OF LEFT FOOT: ICD-10-CM

## 2022-12-08 PROCEDURE — 99213 OFFICE O/P EST LOW 20 MIN: CPT | Performed by: ORTHOPAEDIC SURGERY

## 2022-12-08 NOTE — PROGRESS NOTES
Ankle Follow Up      Patient: Lorri Ochoa    YOB: 1980 42 y.o. female    Chief Complaints: Ankle still sore and swells    History of Present Illness:Patient was seen initially on 10/13/2022 reporting an approximate 1-1/2-year history of pain in her left foot and ankle.  She has had some previous sprains and reported pain over the dorsum of the midfoot hindfoot area and some radiating down into the toes as well as pain in the ankle.  Anterolaterally and some posterior laterally more so than medially.  She reported feelings of pins-and-needles feeling in her toes and foot that had been progressively worsening.     She had been told in the past that she may have some sciatic nerve symptoms and degenerative disc problems.  She has a history of fibromyalgia as well as previous neck surgery in [place].     Symptoms were worse after prolonged standing all day at work.     Reviewed with her that this was somewhat of a complex situation and she had had previous ankle injury but with the neuritic symptoms could be coming from her back.  She was started on gabapentin and sent for MRI of her left ankle.  She is also referred for formal neurologic evaluation for EMG nerve conduction study.      She was seen on 11/10/2022 and had had persistent feelings of instability to the left ankle after multiple previous sprains but also still has burning pain in her left ankle somewhat anterolaterally with numbness into her first second and third toes.  Does report some intermittent shooting pain from her back down into her leg and had been told in the past that she had sciatic problems and degenerative disc problems.  Gabapentin at 300 mg nightly had helped some.     She has had previous cervical spine surgery done in [place].  She was not due to see neurology for evaluation and EMG nerve conduction study until February.    We reviewed her MRI and discussion events require surgical treatment for stabilization of her  "ankle but felt we needed to have further work-up of her neuritic symptoms prior to that with which she was in agreement.  She was fitted with an ASO brace and instructed if it bothered her nerve symptoms to just use a lateral heel wedge that was provided.  She was sent for MRI of her lumbar spine for further evaluation and gabapentin was increased to 600 mg nightly and prescribed compounding cream of ketoprofen 10%, lidocaine 5% and gabapentin 5% to apply the area of neuritic symptoms of her ankle.    She is seen back today with persistent complaints of pain and swelling in her left ankle and feeling of numbness into her toes.  She states she really has not been having any particular shooting pains going from her back down into her foot.  The gabapentin 600 mg nightly has helped to some degree.  She been using her ASO brace and get swelling in her ankle by the end of her workday.  HPI    ROS: ankle pain  Past Medical History:   Diagnosis Date   • Fibromyalgia    • H/O degenerative disc disease    • Nephrolithiasis        Physical Exam:   Vitals:    12/08/22 0914   Resp: 12   Temp: 97.6 °F (36.4 °C)   Weight: 120 kg (264 lb 3.2 oz)   Height: 172.7 cm (68\")     Well developed with normal mood.  On exam there was mild to moderate swelling of the left ankle with some diminished sensation in the toes grossly sensate light touch at the ankle.  Questionable Tinel's over the superficial peroneal nerve really could not elicit any along the tibial nerve today.      Radiology: MRI films and report of the lumbar spine reviewed from 12/5/2022.  There were hypertrophic facet changes observed at L3-4, L4-5, and L5-S1 without significant stenosis.  The spinal canal and neural foramina were widely patent at every level.    There was a focus of high T2 signal at the annulus fibrosis along the posterior lateral right L4-5 disc evident only on T2 sagittal image #5 not associated with any disc extrusion or stenosis.  There was surgical " absence of the left kidney.  An overall impression was mild degenerative change of the lumbar spine but no evidence of significant disc deformity stenosis or other lesion to account for left lower extremity symptoms.      MRI films and report of the left ankle dated 11/3/2022 show articular cartilage is preserved without synovitis osteochondral defect or intra-articular body.  Peroneal tendons appear normal.     The distal syndesmosis and deltoid appear intact.     Posterior talofibular ligament is intact although a little thinner than usual.  No normal ATFL ligament tissue is identified.  Lisfranc is normal as is tarsal tunnel      Assessment/Plan:   Previous left ankle sprain with chronic ATFL injury without peroneal tendon injury  2.  Persistent left neuritic symptoms in left foot of unclear origin    We discussed treatment going forward and may ultimately require surgical treatment for her ankle but certainly need to get further neurologic work-up.    Given the lack of clear pathology on the back MRI to explain this we will hold off on formal spine surgical evaluation.    She is can continue with the compounding cream and 600 mg of gabapentin nightly.    She is due to have her EMG nerve conduction study done sometime in February and also previously placed formal neurologic consult which I believe is going to be in February as well.    Will have her continue with her ASO brace for now and if anything worsens to let me know otherwise we will see her back after she has had further neurologic evaluation.

## 2023-01-12 ENCOUNTER — TRANSCRIBE ORDERS (OUTPATIENT)
Dept: ADMINISTRATIVE | Facility: HOSPITAL | Age: 43
End: 2023-01-12
Payer: MEDICAID

## 2023-01-12 DIAGNOSIS — R06.09 DYSPNEA ON EXERTION: Primary | ICD-10-CM

## 2023-02-06 ENCOUNTER — HOSPITAL ENCOUNTER (OUTPATIENT)
Dept: CARDIOLOGY | Facility: HOSPITAL | Age: 43
Discharge: HOME | End: 2023-02-06
Admitting: INTERNAL MEDICINE
Payer: MEDICAID

## 2023-02-06 VITALS
BODY MASS INDEX: 36.37 KG/M2 | HEIGHT: 68 IN | SYSTOLIC BLOOD PRESSURE: 176 MMHG | DIASTOLIC BLOOD PRESSURE: 92 MMHG | WEIGHT: 240 LBS | HEART RATE: 105 BPM

## 2023-02-06 DIAGNOSIS — R06.09 DYSPNEA ON EXERTION: ICD-10-CM

## 2023-02-06 LAB
AORTIC DIMENSIONLESS INDEX: 0.7 (DI)
BH CV ECHO MEAS - AO MAX PG: 5.9 MMHG
BH CV ECHO MEAS - AO MEAN PG: 3.1 MMHG
BH CV ECHO MEAS - AO V2 MAX: 121.6 CM/SEC
BH CV ECHO MEAS - AO V2 VTI: 17.9 CM
BH CV ECHO MEAS - AVA(I,D): 2.8 CM2
BH CV ECHO MEAS - EDV(CUBED): 89.7 ML
BH CV ECHO MEAS - EDV(MOD-SP4): 107 ML
BH CV ECHO MEAS - EF(MOD-BP): 67 %
BH CV ECHO MEAS - EF(MOD-SP4): 62.6 %
BH CV ECHO MEAS - ESV(CUBED): 30 ML
BH CV ECHO MEAS - ESV(MOD-SP4): 40 ML
BH CV ECHO MEAS - FS: 30.5 %
BH CV ECHO MEAS - IVS/LVPW: 1.31 CM
BH CV ECHO MEAS - IVSD: 1.18 CM
BH CV ECHO MEAS - LAT PEAK E' VEL: 13.2 CM/SEC
BH CV ECHO MEAS - LV DIASTOLIC VOL/BSA (35-75): 48.4 CM2
BH CV ECHO MEAS - LV MASS(C)D: 159.6 GRAMS
BH CV ECHO MEAS - LV MAX PG: 3.2 MMHG
BH CV ECHO MEAS - LV MEAN PG: 1.48 MMHG
BH CV ECHO MEAS - LV SYSTOLIC VOL/BSA (12-30): 18.1 CM2
BH CV ECHO MEAS - LV V1 MAX: 88.8 CM/SEC
BH CV ECHO MEAS - LV V1 VTI: 13.5 CM
BH CV ECHO MEAS - LVIDD: 4.5 CM
BH CV ECHO MEAS - LVIDS: 3.1 CM
BH CV ECHO MEAS - LVOT AREA: 3.8 CM2
BH CV ECHO MEAS - LVOT DIAM: 2.19 CM
BH CV ECHO MEAS - LVPWD: 0.9 CM
BH CV ECHO MEAS - MED PEAK E' VEL: 11.7 CM/SEC
BH CV ECHO MEAS - MV A DUR: 0.1 SEC
BH CV ECHO MEAS - MV A MAX VEL: 70.7 CM/SEC
BH CV ECHO MEAS - MV DEC SLOPE: 405.2 CM/SEC2
BH CV ECHO MEAS - MV DEC TIME: 0.22 MSEC
BH CV ECHO MEAS - MV E MAX VEL: 75.4 CM/SEC
BH CV ECHO MEAS - MV E/A: 1.07
BH CV ECHO MEAS - MV MAX PG: 4 MMHG
BH CV ECHO MEAS - MV MEAN PG: 2.01 MMHG
BH CV ECHO MEAS - MV P1/2T: 56.2 MSEC
BH CV ECHO MEAS - MV V2 VTI: 16.7 CM
BH CV ECHO MEAS - MVA(P1/2T): 3.9 CM2
BH CV ECHO MEAS - MVA(VTI): 3 CM2
BH CV ECHO MEAS - PA ACC TIME: 0.11 SEC
BH CV ECHO MEAS - PA PR(ACCEL): 27.9 MMHG
BH CV ECHO MEAS - PA V2 MAX: 90.3 CM/SEC
BH CV ECHO MEAS - PULM DIAS VEL: 36.9 CM/SEC
BH CV ECHO MEAS - PULM S/D: 1.24
BH CV ECHO MEAS - PULM SYS VEL: 45.6 CM/SEC
BH CV ECHO MEAS - RAP SYSTOLE: 3 MMHG
BH CV ECHO MEAS - RV MAX PG: 1.61 MMHG
BH CV ECHO MEAS - RV V1 MAX: 63.4 CM/SEC
BH CV ECHO MEAS - RV V1 VTI: 12.8 CM
BH CV ECHO MEAS - SI(MOD-SP4): 30.3 ML/M2
BH CV ECHO MEAS - SV(LVOT): 50.9 ML
BH CV ECHO MEAS - SV(MOD-SP4): 67 ML
BH CV ECHO MEAS - TAPSE (>1.6): 2.18 CM
BH CV ECHO MEASUREMENTS AVERAGE E/E' RATIO: 6.06
BH CV XLRA - RV BASE: 2.6 CM
BH CV XLRA - RV LENGTH: 6.8 CM
BH CV XLRA - RV MID: 2.04 CM
BH CV XLRA - TDI S': 13.2 CM/SEC
MAXIMAL PREDICTED HEART RATE: 178 BPM
SINUS: 2.7 CM
STRESS TARGET HR: 151 BPM

## 2023-02-06 PROCEDURE — 93306 TTE W/DOPPLER COMPLETE: CPT

## 2023-02-06 PROCEDURE — 93306 TTE W/DOPPLER COMPLETE: CPT | Performed by: INTERNAL MEDICINE

## 2023-02-23 ENCOUNTER — HOSPITAL ENCOUNTER (OUTPATIENT)
Dept: INFUSION THERAPY | Facility: HOSPITAL | Age: 43
Discharge: HOME | End: 2023-02-23
Admitting: PSYCHIATRY & NEUROLOGY
Payer: MEDICAID

## 2023-02-23 DIAGNOSIS — G57.92 NEURITIS OF LEFT FOOT: ICD-10-CM

## 2023-02-23 PROCEDURE — 95886 MUSC TEST DONE W/N TEST COMP: CPT | Performed by: PSYCHIATRY & NEUROLOGY

## 2023-02-23 PROCEDURE — 95886 MUSC TEST DONE W/N TEST COMP: CPT

## 2023-02-23 PROCEDURE — 95909 NRV CNDJ TST 5-6 STUDIES: CPT

## 2023-02-23 PROCEDURE — 95909 NRV CNDJ TST 5-6 STUDIES: CPT | Performed by: PSYCHIATRY & NEUROLOGY

## 2023-02-23 NOTE — PROCEDURES
EMG and Nerve Conduction Studies    I.      Instrument used: Neuromax 1002  II.     Please see data sheets for tabular summary of NCS and details on methods, temperatures and lab standards.   III.    EMG muscles tested for upper extremity studies include the deltoid, biceps, triceps, pronator teres, extensor digitorum communis, first dorsal interosseous and abductor pollicis brevis.    IV.   EMG muscles tested for lower extremity studies include the vastus lateralis, tibialis anterior, peroneus longus, medial gastrocnemius and extensor digitorum brevis.    V.    Additional muscles tested as needed.  Paraspinal muscles tested as needed.   VI.   Please see data sheets for tabular summary of EMG findings.   VII. The complete report includes the data sheets.      Indication: Numbness of the toes of the left foot  History: 42-year-old white female with longstanding symptoms of numbness in the toes of the left foot.  She has some left ankle swelling periodically.  This seems to be related to when her symptoms are worse.  She has perhaps minimal symptoms on the right.  She does have some back pain history.  She denies diabetes.      Ht: 172.7 cm  Wt: 109 kg; BMI 36.49  HbA1C: No results found for: HGBA1C  TSH:   Lab Results   Component Value Date    TSH 2.190 11/23/2022       Technical summary:  Nerve conduction studies were obtained in the left leg with 1 comparison on the right.  Skin temperatures were at least 32 °C for the majority of the study.  Temperature correction was not needed.  Needle examination was obtained on selected muscles of the left leg including additional intrinsic foot muscles.    Results:  1.  Normal left sural sensory distal latency and amplitude.  2.  Normal left superficial peroneal sensory distal latency and amplitude.  3.  Prolonged left medial orthodromic mixed plantar latency of 4.0 ms with low amplitude of 3.7 µV.  Normal right medial orthodromic mixed plantar distal latency at 3.7 ms with  normal amplitude.  4.  Normal left peroneal motor conduction velocities with normal distal latency and amplitudes.  5.  Normal left tibial motor conduction velocity with normal distal latencies along the medial and lateral plantar motor nerves.  Normal amplitudes.  6.  Needle examination of selected muscles in the left leg showed normal insertional activities.  There were normal motor units and recruitment patterns other than some reduced recruitment in some muscles due to pain.    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  No other findings for a more diffuse polyneuropathy and no evidence of a left lumbosacral radiculopathy by this study.  Study results were discussed with the patient.    Lucas Monique M.D.              Dictated utilizing Dragon dictation.

## 2023-02-28 ENCOUNTER — OFFICE VISIT (OUTPATIENT)
Dept: NEUROLOGY | Facility: CLINIC | Age: 43
End: 2023-02-28
Payer: MEDICAID

## 2023-02-28 VITALS
DIASTOLIC BLOOD PRESSURE: 88 MMHG | HEART RATE: 84 BPM | BODY MASS INDEX: 39.87 KG/M2 | SYSTOLIC BLOOD PRESSURE: 138 MMHG | HEIGHT: 67 IN | OXYGEN SATURATION: 100 % | WEIGHT: 254 LBS

## 2023-02-28 DIAGNOSIS — M54.32 SCIATICA OF LEFT SIDE: ICD-10-CM

## 2023-02-28 DIAGNOSIS — G57.52 TARSAL TUNNEL SYNDROME OF LEFT SIDE: ICD-10-CM

## 2023-02-28 DIAGNOSIS — G43.109 MIGRAINE WITH AURA AND WITHOUT STATUS MIGRAINOSUS, NOT INTRACTABLE: ICD-10-CM

## 2023-02-28 DIAGNOSIS — R55 SYNCOPE AND COLLAPSE: Primary | ICD-10-CM

## 2023-02-28 PROBLEM — R32 URINARY INCONTINENCE: Status: ACTIVE | Noted: 2022-12-15

## 2023-02-28 PROBLEM — Z90.5 SINGLE FUNCTIONAL KIDNEY: Status: ACTIVE | Noted: 2022-12-15

## 2023-02-28 PROBLEM — R60.9 EDEMA: Status: ACTIVE | Noted: 2022-12-15

## 2023-02-28 PROBLEM — Z87.442 PERSONAL HISTORY OF KIDNEY STONES: Status: ACTIVE | Noted: 2022-12-15

## 2023-02-28 PROCEDURE — 99215 OFFICE O/P EST HI 40 MIN: CPT | Performed by: PSYCHIATRY & NEUROLOGY
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
## 2023-03-06 ENCOUNTER — TELEPHONE (OUTPATIENT)
Dept: ORTHOPEDIC SURGERY | Facility: CLINIC | Age: 43
End: 2023-03-06

## 2023-03-06 NOTE — TELEPHONE ENCOUNTER
SPOKE WITH PATIENT RESCHEDULED NO SHOW APPT FOR 3/13/2023 AT 8:30 AM    NUMBER CALLED:[phone]    3/6/2023 BEN

## 2023-03-13 ENCOUNTER — OFFICE VISIT (OUTPATIENT)
Dept: ORTHOPEDIC SURGERY | Facility: CLINIC | Age: 43
End: 2023-03-13
Payer: MEDICAID

## 2023-03-13 VITALS — WEIGHT: 256.8 LBS | BODY MASS INDEX: 38.92 KG/M2 | HEIGHT: 68 IN | TEMPERATURE: 97.6 F

## 2023-03-13 DIAGNOSIS — M54.32 SCIATICA OF LEFT SIDE: ICD-10-CM

## 2023-03-13 DIAGNOSIS — S93.402S SPRAIN OF LEFT ANKLE, UNSPECIFIED LIGAMENT, SEQUELA: ICD-10-CM

## 2023-03-13 DIAGNOSIS — R20.0 NUMBNESS OF TOES: ICD-10-CM

## 2023-03-13 DIAGNOSIS — G57.52 TARSAL TUNNEL SYNDROME, LEFT: Primary | ICD-10-CM

## 2023-03-13 PROCEDURE — 99214 OFFICE O/P EST MOD 30 MIN: CPT | Performed by: ORTHOPAEDIC SURGERY

## 2023-03-14 NOTE — PROGRESS NOTES
Ankle Follow Up      Patient: Lorri Ochoa    YOB: 1980 42 y.o. female    Chief Complaints: Ankle pain    History of Present Illness:Patient was seen initially on 10/13/2022 reporting an approximate 1-1/2-year history of pain in her left foot and ankle.  She has had some previous sprains and reported pain over the dorsum of the midfoot hindfoot area and some radiating down into the toes as well as pain in the ankle.  Anterolaterally and some posterior laterally more so than medially.  She reported feelings of pins-and-needles feeling in her toes and foot that had been progressively worsening.     She had been told in the past that she may have some sciatic nerve symptoms and degenerative disc problems.  She has a history of fibromyalgia as well as previous neck surgery in [place].     Symptoms were worse after prolonged standing all day at work.     Reviewed with her that this was somewhat of a complex situation and she had had previous ankle injury but with the neuritic symptoms could be coming from her back.  She was started on gabapentin and sent for MRI of her left ankle.  She is also referred for formal neurologic evaluation for EMG nerve conduction study.      She was seen on 11/10/2022 and had had persistent feelings of instability to the left ankle after multiple previous sprains but also still has burning pain in her left ankle somewhat anterolaterally with numbness into her first second and third toes.  She did report some intermittent shooting pain from her back down into her leg and had been told in the past that she had sciatic problems and degenerative disc problems.  Gabapentin at 300 mg nightly had helped some.     She had had previous cervical spine surgery done in [place].  She was not due to see neurology for evaluation and EMG nerve conduction study until February.     We reviewed her MRI and discussion events require surgical treatment for stabilization of her ankle but felt  we needed to have further work-up of her neuritic symptoms prior to that with which she was in agreement.  She was fitted with an ASO brace and instructed if it bothered her nerve symptoms to just use a lateral heel wedge that was provided.  She was sent for MRI of her lumbar spine for further evaluation and gabapentin was increased to 600 mg nightly and prescribed compounding cream of ketoprofen 10%, lidocaine 5% and gabapentin 5% to apply the area of neuritic symptoms of her ankle.     She was seen on 12/8/2022 with persistent complaints of pain and swelling in her left ankle and feeling of numbness into her toes.  She stated she really had not been having any particular shooting pains going from her back down into her foot.  The gabapentin 600 mg nightly had helped to some degree.  She had been using her ASO brace and got some swelling in her ankle by the end of her workday.    We did not see any clear pathology from her back to explain the symptoms in her foot.  She was instructed on continuing use of compounding cream and gabapentin.  She was due to have her EMG nerve conduction study until sometime in February as well as formal neurologic work-up at that time.  She was instructed to do with her ASO brace.    She is seen back today saying that she still gets some numbness mostly in her first second and third toes  And some intermittent discomfort in her ankle with intermittent feelings of instability but using a hightop boot that helps and only intermittently using her brace.    She has seen neurology (Dr. Lucas Monique on 2/28/2023) I reviewed their notes apparently felt that the numbness in her foot was likely related to tarsal tunnel syndrome and did have some left-sided sciatica but EMG was negative for radiculopathy and MRI was unremarkable for any major pathology.  She did have some other neurologic symptoms with what it sounded like a syncopal episode 3 days prior and they were getting further work-up on  "that.    ROS: ankle pain  Past Medical History:   Diagnosis Date   • Fibromyalgia    • H/O degenerative disc disease    • Nephrolithiasis        Physical Exam:   Vitals:    03/13/23 0840   Temp: 97.6 °F (36.4 °C)   TempSrc: Temporal   Weight: 116 kg (256 lb 12.8 oz)   Height: 172.7 cm (68\")   PainSc:   6   PainLoc: Ankle     Well developed with normal mood.  On exam she has mild swelling to the lateral ankle with some diminished sensation mainly in the first second and third toes.  Some of a questionable Tinel's over the inferomedial hindfoot more so than the superficial peroneal nerve which had not been as evident on previous exam.    Radiology:MRI films and report of the lumbar spine reviewed from 12/5/2022.  There were hypertrophic facet changes observed at L3-4, L4-5, and L5-S1 without significant stenosis.  The spinal canal and neural foramina were widely patent at every level.     There was a focus of high T2 signal at the annulus fibrosis along the posterior lateral right L4-5 disc evident only on T2 sagittal image #5 not associated with any disc extrusion or stenosis.  There was surgical absence of the left kidney.  An overall impression was mild degenerative change of the lumbar spine but no evidence of significant disc deformity stenosis or other lesion to account for left lower extremity symptoms.        MRI films and report of the left ankle dated 11/3/2022 show articular cartilage is preserved without synovitis osteochondral defect or intra-articular body.  Peroneal tendons appear normal.     The distal syndesmosis and deltoid appear intact.     Posterior talofibular ligament is intact although a little thinner than usual.  No normal ATFL ligament tissue is identified.  Lisfranc is normal as is tarsal tunnel    EMG nerve conduction study report reviewed from 2/23/2023 which showed mild left tibial neuropathy at the ankle based on prolongation of the medial orthodromic mixed plantar latency but no " evidence of more diffuse polyneuropathy nor any evidence of left lumbosacral radiculopathy.         Assessment/Plan:  Previous left ankle sprain with chronic ATFL injury without peroneal tendon injury  2.  Persistent left neuritic symptoms in an left foot most likely tarsal tunnel with evidence suggestive of this on the EMG nerve conduction study and supported by neurology evaluation without clear evidence of lumbar origin    We discussed treatment going forward she does not desire anything done from a surgical standpoint at this time.  Also reviewed with her that the ankle stability surgery would be different than for the nerve release which could be somewhat equivocal.  She never got the compounding cream due to expense but think she can now and wrote her a new prescription for this of ketoprofen 10%, gabapentin 5% and lidocaine 5%.    She will continue with gabapentin 600 mg nightly.  We will also send her to see Dr. Mike for further evaluation of nonoperative treatment of the tarsal tunnel with ultrasound-guided injection of the tarsal tunnel from a diagnostic and therapeutic standpoint    We discussed physical therapy and she has been doing some exercises she found online for her sciatica and wants to hold off on any further formal therapy.    She will continue with her hightop boot or brace and we will see her back in 6 weeks to assess progress and determine treatment course going forward.

## 2023-03-15 ENCOUNTER — HOSPITAL ENCOUNTER (OUTPATIENT)
Dept: SLEEP MEDICINE | Facility: HOSPITAL | Age: 43
Discharge: HOME | End: 2023-03-15
Admitting: PSYCHIATRY & NEUROLOGY
Payer: MEDICAID

## 2023-03-15 DIAGNOSIS — R55 SYNCOPE AND COLLAPSE: ICD-10-CM

## 2023-03-15 PROCEDURE — 95813 EEG EXTND MNTR 61-119 MIN: CPT | Performed by: PSYCHIATRY & NEUROLOGY

## 2023-03-15 PROCEDURE — 95813 EEG EXTND MNTR 61-119 MIN: CPT

## 2023-03-28 ENCOUNTER — OFFICE VISIT (OUTPATIENT)
Dept: SPORTS MEDICINE | Facility: CLINIC | Age: 43
End: 2023-03-28
Payer: MEDICAID

## 2023-03-28 VITALS
HEART RATE: 85 BPM | BODY MASS INDEX: 38.8 KG/M2 | TEMPERATURE: 98.5 F | SYSTOLIC BLOOD PRESSURE: 138 MMHG | OXYGEN SATURATION: 98 % | HEIGHT: 68 IN | DIASTOLIC BLOOD PRESSURE: 84 MMHG | WEIGHT: 256 LBS

## 2023-03-28 DIAGNOSIS — G57.52 TARSAL TUNNEL SYNDROME, LEFT: Primary | ICD-10-CM

## 2023-03-28 RX ORDER — TRIAMCINOLONE ACETONIDE 40 MG/ML
40 INJECTION, SUSPENSION INTRA-ARTICULAR; INTRAMUSCULAR ONCE
Status: COMPLETED | OUTPATIENT
Start: 2023-03-28 | End: 2023-03-28

## 2023-03-28 RX ADMIN — TRIAMCINOLONE ACETONIDE 40 MG: 40 INJECTION, SUSPENSION INTRA-ARTICULAR; INTRAMUSCULAR at 11:47

## 2023-03-28 NOTE — PROGRESS NOTES
"Lorri is a 42 y.o. year old female     Chief Complaint   Patient presents with   • Foot Pain     LEFT FOOT- patient in office wanting to discuss getting steroid injection if poss today       History of Present Illness  HPI   Here today for left foot tarsal tunnel injection.  She has a long history of pain in this foot and ankle and recently had studies suggesting this is the source of her toe numbness.  Here today for consideration of injection.  I reviewed her records and believe this is very appropriate.  Consent was verified.      Review of Systems    /84 (BP Location: Left arm, Patient Position: Sitting, Cuff Size: Large Adult)   Pulse 85   Temp 98.5 °F (36.9 °C) (Temporal)   Ht 172.7 cm (67.99\")   Wt 116 kg (256 lb)   SpO2 98%   BMI 38.93 kg/m²      Physical Exam    Vital signs reviewed.   General: No acute distress.      MSK Exam:  Ortho Exam    Procedure note: Ultrasound-guided tarsal tunnel injection - left    We discussed indications for the procedure as well as risks, benefits, and alternatives.  Verbal consent was verified. Procedure was performed by physician.   Ultrasound was used to identify the components of the tarsal tunnel.  Injection site was marked, then prepped with Betadine and alcohol for sterile technique.  Subsequently the 27G 1.25\" needle was guided under continuous direct ultrasound visualization, in plane with the probe, using a cross-sectional approach to guide the needle to the nerve.  Injectate was applied around the tibial nerve and tolerated well.  Simple bandage was applied.    Injectate:  1% lidocaine without epinephrine: 1 mL  40 mg/mL triamcinolone acetonide: 1 mL     Diagnoses and all orders for this visit:    Tarsal tunnel syndrome, left  -     triamcinolone acetonide (KENALOG-40) injection 40 mg    Injection tolerated well.  Continue follow-up as planned with Dr. Arce.  She can follow-up with me if needed for any additional assistance along the " tricia.      EMR Dragon/Transcription disclaimer:    Much of this encounter note is an electronic transcription/translation of spoken language to printed text.  The electronic translation of spoken language may permit erroneous, or at times, nonsensical words or phrases to be inadvertently transcribed.  Although I have reviewed the note for such errors some may still exist.

## 2023-03-29 ENCOUNTER — TELEPHONE (OUTPATIENT)
Dept: NEUROLOGY | Facility: CLINIC | Age: 43
End: 2023-03-29
Payer: MEDICAID

## 2023-03-31 ENCOUNTER — PATIENT ROUNDING (BHMG ONLY) (OUTPATIENT)
Dept: SPORTS MEDICINE | Facility: CLINIC | Age: 43
End: 2023-03-31
Payer: MEDICAID

## 2023-03-31 NOTE — PROGRESS NOTES
March 31, 2023    A [facility] Message has been sent to the patient for [place] [facility]

## 2023-03-31 NOTE — PROGRESS NOTES
Kenalog injection was administered in office 03/28/2023; not patient supplied   NDC: [address]-8  LOT: [id number]  EXP DATE:09/01/2024

## 2023-04-24 ENCOUNTER — TELEPHONE (OUTPATIENT)
Dept: ORTHOPEDIC SURGERY | Facility: CLINIC | Age: 43
End: 2023-04-24

## 2023-04-24 NOTE — TELEPHONE ENCOUNTER
CALLED PATIENT TO RESCHEDULE NO SHOW APPT, MAILBOX NOT SET UP     NUMBER CALLED:[phone]    4/24/23 BEN